# Patient Record
Sex: MALE | Race: WHITE | NOT HISPANIC OR LATINO | URBAN - METROPOLITAN AREA
[De-identification: names, ages, dates, MRNs, and addresses within clinical notes are randomized per-mention and may not be internally consistent; named-entity substitution may affect disease eponyms.]

---

## 2017-06-29 NOTE — PATIENT DISCUSSION
(P26.212) Dermatochalasis of right upper eyelid - Assesment : Examination revealed Dermatochalasis CHANCE OF INCREASED DRYNESS OF EYES AFTER EYELID SURGERY. WILL NEED TO D/C CONTACT LENS USE FOR 2 WEEKS AFTER SURGERY. - Plan : TYPE OF SURGERY: BULB          I counseled the patient regarding the following:Blepharoplasty: Bilateral upper blepharoplasty discussed with patient. Visual Fields and Photos were done today to demonstrate the visual impairment related to the visual complaint of the patient. R/B/A to surgery discussed with patient, including, but not limited to: scar, pain, bleeding, infection, loss of vision, double vision, asymmetry, over correction, under correction, poor cosmetic outcome, difficulty closing eyes, dry eye, dependence on lubricating drops postoperatively. Patient understands brow ptosis composes part of upper lid ptosis. Patient understands R/B/A and would like to proceed with surgery in the near future.  ** PT NEEDS TO D/C DEBI -- NEEDS MEDICAL CLEARANCE TO D/C**

## 2017-06-29 NOTE — PATIENT DISCUSSION
(H25.13) Age-related nuclear cataract, bilateral - Assesment : Examination revealed cataract. -TRACE - Plan : Monitor for changes. Advised patient to call our office with decreased vision or increased symptoms.

## 2017-06-29 NOTE — PATIENT DISCUSSION
(H85.517) Dermatochalasis of left upper eyelid - Assesment : Examination revealed Dermatochalasis DISCUSSED WITH PATIENT LEFT BROW IS LOWER THAN RIGHT BROW ( BROW ASYMMETRY) BLEPHAROPLASTY WILL NOT CORRECT BROW ASYMMETRY. CHANCE OF INCREASED DRYNESS OF EYES AFTER EYELID SURGERY. WILL NEED TO D/C CONTACT LENS USE FOR 2 WEEKS AFTER SURGERY. - Plan : TYPE OF SURGERY: BULB          I counseled the patient regarding the following:Blepharoplasty: Bilateral upper blepharoplasty discussed with patient. Visual Fields and Photos were done today to demonstrate the visual impairment related to the visual complaint of the patient. R/B/A to surgery discussed with patient, including, but not limited to: scar, pain, bleeding, infection, loss of vision, double vision, asymmetry, over correction, under correction, poor cosmetic outcome, difficulty closing eyes, dry eye, dependence on lubricating drops postoperatively. Patient understands brow ptosis composes part of upper lid ptosis. Patient understands R/B/A and would like to proceed with surgery in the near future.  ** PT NEEDS TO D/C DEBI -- NEEDS MEDICAL CLEARANCE TO D/C**

## 2017-08-10 NOTE — PATIENT DISCUSSION
(Q66.636) Dermatochalasis of left upper eyelid - Assesment : Status post operative BULB. Healing well+ - Plan : Discussed signs and symptoms of infection, bleeding, dry eyes. Continue Antibiotic Ointment and tears per the post operative instructions.  Follow up in one week suture removal

## 2017-08-10 NOTE — PATIENT DISCUSSION
(R31.899) Dermatochalasis of right upper eyelid - Assesment : Status post operative BULB. Healing well - Plan : Discussed signs and symptoms of infection, bleeding, dry eyes. Continue Antibiotic Ointment and tears per the post operative instructions.  Follow up in one week suture removal.

## 2017-08-16 NOTE — PATIENT DISCUSSION
(V43.129) Dermatochalasis of left upper eyelid - Assesment : Status post operative BULB. Healing well - Plan : Discussed signs and symptoms of infection, bleeding, dry eyes. Continue Antibiotic Ointment and tears per the post operative instructions.   Follow up as scheduled

## 2017-08-18 NOTE — PATIENT DISCUSSION
(W57.306) Dermatochalasis of left upper eyelid - Assesment : Status post operative BULB. Healing well - Plan : Discussed signs and symptoms of infection, bleeding, dry eyes. Continue Antibiotic Ointment X 4 MORE NIGHTS THEN STOP,  and tears per the post operative instructions.   SUTURES REMOVED BY JOEL

## 2017-08-18 NOTE — PATIENT DISCUSSION
(P72.685) Dermatochalasis of right upper eyelid - Assesment : Status post operative BULB. Healing well - Plan : Discussed signs and symptoms of infection, bleeding, dry eyes. Continue Antibiotic Ointment X 4 MORE NIGHTS THEN STOP, tears per the post operative instructions.   SUTURES REMOVED BY JOEL

## 2017-11-17 NOTE — PATIENT DISCUSSION
(H40.053) Ocular hypertension, bilateral - Assesment : Examination revealed increased intraocular pressure. Increased OU - Plan : Monitor for changes. Advised patient to call our office with decreased vision or an increase in symptoms. RTC  6 months tension check OCT nerve and pachy.

## 2017-11-17 NOTE — PATIENT DISCUSSION
(I40.884) Keratoconjunct sicca, not specified as Sjogren's, bilateral - Assesment : Examination revealed Dry Eye Syndrome OU. - Plan : Monitor for changes. Advised patient to call our office with decreased vision or increased symptoms. Recommend the use of rewetting drops OU.

## 2018-05-24 NOTE — PATIENT DISCUSSION
(W25.736) Keratoconjunct sicca, not specified as Sjogren's, bilateral - Assesment : Examination revealed Dry Eye Syndrome OU. - Plan : Monitor for changes. Advised patient to call our office with decreased vision or increased symptoms . Continue the use of rewetting drops OU.

## 2018-05-24 NOTE — PATIENT DISCUSSION
(H40.013) Open angle with borderline findings, low risk, bilateral - Assesment : Examination revealed suspicion for Open Angle Glaucoma. Low risk. Couple elevated IOPs reading  may be due to previous use of steroid medication. - Plan : Monitor for changes. Advised patient to call our office when decreased vision or increased eye pain.   RTC   1 year/EXAM /VF

## 2019-02-14 NOTE — PATIENT DISCUSSION
(H40.013) Open angle with borderline findings, low risk, bilateral - Assesment : Examination revealed suspicion for Open Angle Glaucoma. Low risk. - Plan : Monitor for changes. Advised patient to call our office when decreased vision or increased eye pain.

## 2019-02-14 NOTE — PATIENT DISCUSSION
(Z50.665) Keratoconjunct sicca, not specified as Sjogren's, bilateral - Assesment : Examination revealed Dry Eye Syndrome OU. - Plan : Continue the use of rewetting drops OU. Monitor for changes. Advised patient to call our office with decreased vision or increased symptoms.   RV 1 year Cl/Exam.

## 2021-04-16 ENCOUNTER — IMPORTED ENCOUNTER (OUTPATIENT)
Dept: URBAN - METROPOLITAN AREA CLINIC 1 | Facility: CLINIC | Age: 41
End: 2021-04-16

## 2021-04-16 PROBLEM — H52.4: Noted: 2021-04-16

## 2021-04-16 PROBLEM — H52.13: Noted: 2021-04-16

## 2021-04-16 PROBLEM — H52.223: Noted: 2021-04-16

## 2021-04-16 PROCEDURE — S0620 ROUTINE OPHTHALMOLOGICAL EXA: HCPCS

## 2021-04-16 NOTE — PATIENT DISCUSSION
1. Myopia w/ Astigmatism OU -- Rx was given for correction if indicated and requested. 2. Presbyopia Return for an appointment in 1 year 36 with Dr. Odin Patel.

## 2021-05-12 ENCOUNTER — IMPORTED ENCOUNTER (OUTPATIENT)
Dept: URBAN - METROPOLITAN AREA CLINIC 1 | Facility: CLINIC | Age: 41
End: 2021-05-12

## 2021-05-12 NOTE — PATIENT DISCUSSION
GC today - MRx checked by RBF and changes made. New MRx finalized and given to pt. Return for an appointment for Return as scheduled with Dr. Jacques Vasquez.

## 2022-04-02 ASSESSMENT — VISUAL ACUITY
OD_CC: 20/30
OS_CC: 20/150
OD_SC: 20/25
OS_CC: 20/30
OD_CC: 20/100
OS_SC: 20/40

## 2022-04-02 ASSESSMENT — TONOMETRY
OD_IOP_MMHG: 15
OS_IOP_MMHG: 15

## 2022-04-02 ASSESSMENT — KERATOMETRY
OD_K2POWER_DIOPTERS: 45.75
OS_AXISANGLE_DEGREES: 174
OD_K1POWER_DIOPTERS: 43.75
OS_AXISANGLE2_DEGREES: 084
OD_AXISANGLE_DEGREES: 001
OS_K2POWER_DIOPTERS: 45.50
OS_K1POWER_DIOPTERS: 44.00
OD_AXISANGLE2_DEGREES: 091